# Patient Record
Sex: MALE | Race: WHITE
[De-identification: names, ages, dates, MRNs, and addresses within clinical notes are randomized per-mention and may not be internally consistent; named-entity substitution may affect disease eponyms.]

---

## 2018-08-09 ENCOUNTER — HOSPITAL ENCOUNTER (EMERGENCY)
Dept: HOSPITAL 11 - JP.ED | Age: 58
Discharge: HOME | End: 2018-08-09
Payer: COMMERCIAL

## 2018-08-09 DIAGNOSIS — Z79.82: ICD-10-CM

## 2018-08-09 DIAGNOSIS — S80.02XA: Primary | ICD-10-CM

## 2018-08-09 DIAGNOSIS — S50.312A: ICD-10-CM
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
## 2018-08-09 NOTE — CR
Knee 3V Lt

 

CLINICAL HISTORY: Left knee pain, trauma

 

FINDINGS: No acute fracture or dislocation is noted. There are no osseous lesions. There is mild full
ness of the suprapatellar bursa. There is some minimal patellar spurring.

 

Impression: Minimal degenerative change

 

Possible small joint effusion

 

No fracture
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
